# Patient Record
Sex: FEMALE | Race: WHITE | NOT HISPANIC OR LATINO | URBAN - METROPOLITAN AREA
[De-identification: names, ages, dates, MRNs, and addresses within clinical notes are randomized per-mention and may not be internally consistent; named-entity substitution may affect disease eponyms.]

---

## 2022-08-16 PROBLEM — I10 CHRONIC HYPERTENSION: Status: ACTIVE | Noted: 2022-08-16

## 2022-08-16 PROBLEM — O09.91 SUPERVISION OF HIGH RISK PREGNANCY IN FIRST TRIMESTER: Status: ACTIVE | Noted: 2022-08-16

## 2022-08-22 ENCOUNTER — PATIENT MESSAGE (OUTPATIENT)
Dept: ADMINISTRATIVE | Facility: OTHER | Age: 27
End: 2022-08-22

## 2022-08-26 PROBLEM — O46.92 VAGINAL BLEEDING IN PREGNANCY, SECOND TRIMESTER: Status: ACTIVE | Noted: 2022-08-26

## 2022-08-31 ENCOUNTER — SOCIAL WORK (OUTPATIENT)
Dept: ADMINISTRATIVE | Facility: OTHER | Age: 27
End: 2022-08-31

## 2022-08-31 NOTE — PROGRESS NOTES
SW met with pt regarding initial OB assessment. Pt stated this is her 1st pregnancy/0-miscarriage. Pt stated lives with her roommate and able to perform ADL's independently. Pt stated does work. Pt stated support system is her roommate/Chavo. Pt stated has medicaid([employer]). Pt stated does not have WIC. Pt stated unsure about breastfeed. SW provide pt with information on other community resources and referred to the [facility].SW faxed and scanned pt's notification of pregnancy into epic.  No other needs identified at this time.    Racquel Gupta,MSW  Pager#[phone]
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
## 2022-10-25 PROBLEM — O99.212 OBESITY AFFECTING PREGNANCY IN SECOND TRIMESTER: Status: ACTIVE | Noted: 2022-10-25

## 2022-10-25 PROBLEM — O41.02X0 OLIGOHYDRAMNIOS IN SECOND TRIMESTER: Status: ACTIVE | Noted: 2022-10-25

## 2022-10-31 PROBLEM — O46.92 VAGINAL BLEEDING IN PREGNANCY, SECOND TRIMESTER: Status: RESOLVED | Noted: 2022-08-26 | Resolved: 2022-10-31

## 2022-10-31 PROBLEM — O41.02X0 OLIGOHYDRAMNIOS IN SECOND TRIMESTER: Status: RESOLVED | Noted: 2022-10-25 | Resolved: 2022-10-31

## 2022-10-31 PROBLEM — O09.91 SUPERVISION OF HIGH RISK PREGNANCY IN FIRST TRIMESTER: Status: RESOLVED | Noted: 2022-08-16 | Resolved: 2022-10-31
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
## 2022-10-31 PROBLEM — O36.5920 POOR FETAL GROWTH AFFECTING MANAGEMENT OF MOTHER IN SECOND TRIMESTER: Status: ACTIVE | Noted: 2022-10-31

## 2022-12-06 PROBLEM — O41.02X0 OLIGOHYDRAMNIOS IN SECOND TRIMESTER: Status: ACTIVE | Noted: 2022-12-06

## 2023-01-10 PROBLEM — F31.9 BIPOLAR DISORDER: Status: ACTIVE | Noted: 2023-01-10

## 2023-01-10 PROBLEM — O36.5930 POOR FETAL GROWTH AFFECTING MANAGEMENT OF MOTHER IN THIRD TRIMESTER: Status: ACTIVE | Noted: 2022-10-31

## 2023-01-10 PROBLEM — B07.9 VIRAL WART ON FINGER: Status: ACTIVE | Noted: 2023-01-10

## 2023-01-10 PROBLEM — J04.0 ACUTE LARYNGITIS: Status: ACTIVE | Noted: 2023-01-10

## 2023-01-10 PROBLEM — F90.9 ATTENTION-DEFICIT HYPERACTIVITY DISORDER: Status: ACTIVE | Noted: 2023-01-10

## 2023-01-31 PROBLEM — O26.893 PREGNANCY HEADACHE IN THIRD TRIMESTER: Status: ACTIVE | Noted: 2023-01-31

## 2023-01-31 PROBLEM — R51.9 PREGNANCY HEADACHE IN THIRD TRIMESTER: Status: ACTIVE | Noted: 2023-01-31

## 2023-02-13 ENCOUNTER — SOCIAL WORK (OUTPATIENT)
Dept: ADMINISTRATIVE | Facility: OTHER | Age: 28
End: 2023-02-13

## 2023-02-13 NOTE — PROGRESS NOTES
SW received pt's disability paperwork from the  box. SW completed the demographic sheet/attached clinical note on pt's disability paperwork and gave the paperwork to MD for review/signature. SW received pt's completed Disability Paperwork from MD and fax to([phone])ATTN:Kristin. SW notify pt([phone])regarding the above. SW scanned paperwork into epic. No other needs identified at this time.    Racquel GuptaMSW  Pager#[phone]

## 2023-02-21 PROBLEM — G47.9 SLEEP DISORDER: Status: ACTIVE | Noted: 2023-02-21

## 2023-05-15 PROBLEM — O36.5930 POOR FETAL GROWTH AFFECTING MANAGEMENT OF MOTHER IN THIRD TRIMESTER: Status: RESOLVED | Noted: 2022-10-31 | Resolved: 2023-05-15
